# Patient Record
Sex: FEMALE
[De-identification: names, ages, dates, MRNs, and addresses within clinical notes are randomized per-mention and may not be internally consistent; named-entity substitution may affect disease eponyms.]

---

## 2018-06-04 ENCOUNTER — HOSPITAL ENCOUNTER (OUTPATIENT)
Dept: HOSPITAL 92 - ERS | Age: 58
Discharge: HOME | End: 2018-06-04
Attending: UROLOGY
Payer: SELF-PAY

## 2018-06-04 DIAGNOSIS — N13.2: Primary | ICD-10-CM

## 2018-06-04 DIAGNOSIS — S39.012A: ICD-10-CM

## 2018-06-04 DIAGNOSIS — E78.5: ICD-10-CM

## 2018-06-04 DIAGNOSIS — G43.909: ICD-10-CM

## 2018-06-04 LAB
ALBUMIN SERPL BCG-MCNC: 4.3 G/DL (ref 3.5–5)
ALP SERPL-CCNC: 59 U/L (ref 40–150)
ALT SERPL W P-5'-P-CCNC: 22 U/L (ref 8–55)
ANION GAP SERPL CALC-SCNC: 13 MMOL/L (ref 10–20)
APTT PPP: 31.7 SEC (ref 22.9–36.1)
AST SERPL-CCNC: 15 U/L (ref 5–34)
BASOPHILS # BLD AUTO: 0 THOU/UL (ref 0–0.2)
BASOPHILS NFR BLD AUTO: 0.2 % (ref 0–1)
BILIRUB SERPL-MCNC: 0.3 MG/DL (ref 0.2–1.2)
BUN SERPL-MCNC: 14 MG/DL (ref 9.8–20.1)
CALCIUM SERPL-MCNC: 9.7 MG/DL (ref 7.8–10.44)
CHLORIDE SERPL-SCNC: 105 MMOL/L (ref 98–107)
CO2 SERPL-SCNC: 24 MMOL/L (ref 22–29)
CREAT CL PREDICTED SERPL C-G-VRATE: 0 ML/MIN (ref 70–130)
EOSINOPHIL # BLD AUTO: 0.1 THOU/UL (ref 0–0.7)
EOSINOPHIL NFR BLD AUTO: 0.9 % (ref 0–10)
GLOBULIN SER CALC-MCNC: 3.5 G/DL (ref 2.4–3.5)
GLUCOSE SERPL-MCNC: 121 MG/DL (ref 70–105)
HGB BLD-MCNC: 14.1 G/DL (ref 12–16)
INR PPP: 0.9
LYMPHOCYTES # BLD: 1.1 THOU/UL (ref 1.2–3.4)
LYMPHOCYTES NFR BLD AUTO: 14.1 % (ref 21–51)
MCH RBC QN AUTO: 31.7 PG (ref 27–31)
MCV RBC AUTO: 92 FL (ref 81–99)
MONOCYTES # BLD AUTO: 0.1 THOU/UL (ref 0.11–0.59)
MONOCYTES NFR BLD AUTO: 1.6 % (ref 0–10)
NEUTROPHILS # BLD AUTO: 6.2 THOU/UL (ref 1.4–6.5)
NEUTROPHILS NFR BLD AUTO: 83.2 % (ref 42–75)
PLATELET # BLD AUTO: 208 THOU/UL (ref 130–400)
POTASSIUM SERPL-SCNC: 4.2 MMOL/L (ref 3.5–5.1)
PROTHROMBIN TIME: 12.5 SEC (ref 12–14.7)
RBC # BLD AUTO: 4.45 MILL/UL (ref 4.2–5.4)
SODIUM SERPL-SCNC: 138 MMOL/L (ref 136–145)
WBC # BLD AUTO: 7.5 THOU/UL (ref 4.8–10.8)

## 2018-06-04 PROCEDURE — 88300 SURGICAL PATH GROSS: CPT

## 2018-06-04 PROCEDURE — 82365 CALCULUS SPECTROSCOPY: CPT

## 2018-06-04 PROCEDURE — 74176 CT ABD & PELVIS W/O CONTRAST: CPT

## 2018-06-04 PROCEDURE — 96361 HYDRATE IV INFUSION ADD-ON: CPT

## 2018-06-04 PROCEDURE — C1769 GUIDE WIRE: HCPCS

## 2018-06-04 PROCEDURE — 96374 THER/PROPH/DIAG INJ IV PUSH: CPT

## 2018-06-04 PROCEDURE — 85025 COMPLETE CBC W/AUTO DIFF WBC: CPT

## 2018-06-04 PROCEDURE — 85610 PROTHROMBIN TIME: CPT

## 2018-06-04 PROCEDURE — 96372 THER/PROPH/DIAG INJ SC/IM: CPT

## 2018-06-04 PROCEDURE — 85730 THROMBOPLASTIN TIME PARTIAL: CPT

## 2018-06-04 PROCEDURE — 96360 HYDRATION IV INFUSION INIT: CPT

## 2018-06-04 PROCEDURE — 80053 COMPREHEN METABOLIC PANEL: CPT

## 2018-06-04 NOTE — CT
CT OF THE ABDOMEN AND PELVIS:

 

Date:  06/04/18 

 

COMPARISON:  

None. 

 

HISTORY:  

Left paraspinal tenderness/pain radiating into the hip and lower extremity. 

 

TECHNIQUE:  

Serial axial CT imaging at 5 mm intervals from lung bases through pubic symphysis without contrast. C
oronal reformatted imaging obtained. 

 

FINDINGS:

The lack of contrast limits assessment of the viscera, bowel, vascular structures, and for lymphadeno
gabriela. 

 

The imaged lung bases appear unremarkable. No free intraperitoneal air noted. 

 

The liver, gallbladder, spleen, pancreas, and adrenal glands demonstrate a normal noncontrast enhance
d appearance. 

 

There is no evidence for obstructive uropathy or nephrolithiasis on the right. 

 

There is mild left-sided nephromegaly. There is hydronephrosis and hydroureter on the left. The left 
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 There
 is a stone within the distal left ureter at the left ureterovesical junction causing obstruction, be
st seen on axial image 79 and coronal image 96. This distal obstructing stone within the left ureter 
measures approximately 3.0 mm. 

 

There is diverticulosis of the sigmoid colon with no evidence for diverticulitis. There is a fat-cont
aining umbilical hernia present. There is no evidence for bowel obstruction. 

 

The osseous structures demonstrate no acute findings. 

 

IMPRESSION: 

Obstructive uropathy on the left secondary to a 3.0 mm distal left ureteral obstructing stone near th
e level of the ureterovesical junction. 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
ed as described above. 

 

 

POS: [facility]

## 2018-06-05 NOTE — OP
DATE OF PROCEDURE:  06/04/2018

 

PREOPERATIVE DIAGNOSIS:  Left ureteral stone.

 

POSTOPERATIVE DIAGNOSIS:  Left ureteral stone.

 

PROCEDURE:  Cystoscopy, left ureteroscopy, stone extraction.

 

SURGEON:  Mathieu Castellano M.D.

 

ANESTHESIA:  General.

 

INDICATIONS:  Ms. Herring is a 58-year-old female who presented with acute onset of left-sided flank 
pain.  Her pain cannot be well controlled in the emergency room.  CT scan demonstrated distal left ur
eteral stone.  She opted to proceed with surgical intervention.

 

DETAILS OF PROCEDURE:  The patient was given general anesthesia and IV antibiotics.  She is sterilely
 prepped and draped in lithotomy position.  The cystoscope was passed into the bladder.  The bladder 
was examined in its entirety.  There were no mucosal lesions, no stones were seen.  Rigid ureteroscop
y was then performed over a guidewire into the left ureter.  The ureteroscope was passed up to the mi
d ureter where she was noted to have bifurcation of the ureter.  She has a duplicated system with the
 upper pole and lower pole moiety joining at approximately the pelvis.  The ureteral stone was then v
isualized and grasping forceps were utilized to grasp the stone and remove it.  A stent was not place
d.  Retrograde pyelogram was performed.  At the termination of procedure, there are no residual stone
s.  The patient tolerated the procedure well.  She was transported from the operating room to recover
y room in stable condition.

 

COMPLICATIONS:  None.

 

ESTIMATED BLOOD LOSS:  Minimal.

## 2018-06-06 ENCOUNTER — HOSPITAL ENCOUNTER (EMERGENCY)
Dept: HOSPITAL 92 - ERS | Age: 58
Discharge: HOME | End: 2018-06-06
Payer: SELF-PAY

## 2018-06-06 DIAGNOSIS — E78.5: ICD-10-CM

## 2018-06-06 DIAGNOSIS — N13.2: Primary | ICD-10-CM

## 2018-06-06 LAB
ALBUMIN SERPL BCG-MCNC: 3.8 G/DL (ref 3.5–5)
ALP SERPL-CCNC: 55 U/L (ref 40–150)
ALT SERPL W P-5'-P-CCNC: 15 U/L (ref 8–55)
ANION GAP SERPL CALC-SCNC: 11 MMOL/L (ref 10–20)
AST SERPL-CCNC: 11 U/L (ref 5–34)
BASOPHILS # BLD AUTO: 0.1 THOU/UL (ref 0–0.2)
BASOPHILS NFR BLD AUTO: 0.9 % (ref 0–1)
BILIRUB SERPL-MCNC: 0.6 MG/DL (ref 0.2–1.2)
BUN SERPL-MCNC: 22 MG/DL (ref 9.8–20.1)
CALCIUM SERPL-MCNC: 8.8 MG/DL (ref 7.8–10.44)
CHLORIDE SERPL-SCNC: 107 MMOL/L (ref 98–107)
CO2 SERPL-SCNC: 24 MMOL/L (ref 22–29)
CREAT CL PREDICTED SERPL C-G-VRATE: 0 ML/MIN (ref 70–130)
CRYSTAL-AUWI FLAG: 0.2 (ref 0–15)
EOSINOPHIL # BLD AUTO: 0 THOU/UL (ref 0–0.7)
EOSINOPHIL NFR BLD AUTO: 0.2 % (ref 0–10)
GLOBULIN SER CALC-MCNC: 3.1 G/DL (ref 2.4–3.5)
GLUCOSE SERPL-MCNC: 118 MG/DL (ref 70–105)
HEV IGM SER QL: 0.5 (ref 0–7.99)
HGB BLD-MCNC: 12.7 G/DL (ref 12–16)
HYALINE CASTS #/AREA URNS LPF: (no result) LPF
LYMPHOCYTES # BLD: 1.1 THOU/UL (ref 1.2–3.4)
LYMPHOCYTES NFR BLD AUTO: 12.3 % (ref 21–51)
MCH RBC QN AUTO: 31.9 PG (ref 27–31)
MCV RBC AUTO: 93.3 FL (ref 81–99)
MONOCYTES # BLD AUTO: 0.6 THOU/UL (ref 0.11–0.59)
MONOCYTES NFR BLD AUTO: 6.1 % (ref 0–10)
NEUTROPHILS # BLD AUTO: 7.4 THOU/UL (ref 1.4–6.5)
NEUTROPHILS NFR BLD AUTO: 80.5 % (ref 42–75)
PATHC CAST-AUWI FLAG: 0 (ref 0–2.49)
PLATELET # BLD AUTO: 173 THOU/UL (ref 130–400)
POTASSIUM SERPL-SCNC: 3.8 MMOL/L (ref 3.5–5.1)
RBC # BLD AUTO: 3.98 MILL/UL (ref 4.2–5.4)
RBC UR QL AUTO: (no result) HPF (ref 0–3)
SODIUM SERPL-SCNC: 138 MMOL/L (ref 136–145)
SP GR UR STRIP: 1.01 (ref 1–1.04)
SPERM-AUWI FLAG: 0 (ref 0–9.9)
WBC # BLD AUTO: 9.2 THOU/UL (ref 4.8–10.8)
WBC UR QL AUTO: (no result) HPF (ref 0–3)
YEAST-AUWI FLAG: 0 (ref 0–25)

## 2018-06-06 PROCEDURE — 85025 COMPLETE CBC W/AUTO DIFF WBC: CPT

## 2018-06-06 PROCEDURE — 81003 URINALYSIS AUTO W/O SCOPE: CPT

## 2018-06-06 PROCEDURE — 87086 URINE CULTURE/COLONY COUNT: CPT

## 2018-06-06 PROCEDURE — 96361 HYDRATE IV INFUSION ADD-ON: CPT

## 2018-06-06 PROCEDURE — 96374 THER/PROPH/DIAG INJ IV PUSH: CPT

## 2018-06-06 PROCEDURE — 80053 COMPREHEN METABOLIC PANEL: CPT

## 2018-06-06 PROCEDURE — 81015 MICROSCOPIC EXAM OF URINE: CPT

## 2018-06-06 PROCEDURE — 83690 ASSAY OF LIPASE: CPT

## 2018-06-06 PROCEDURE — 74176 CT ABD & PELVIS W/O CONTRAST: CPT

## 2018-06-06 NOTE — CT
CT ABDOMEN AND PELVIS PERFOREMD WITHOUT CONTRAST ENHANCEMENT:

 

HISTORY:

The patient reports left flank pain and reportedly had flank pain surgery on 06/04/2018.  Reports the
 pain is similar to what she had been previously experiencing.

 

COMPARISON:

CT study from 06/04/2018.

 

FINDINGS:

The lung bases show subsegmental atelectasis in the right lung base.

 

The liver, spleen, pancreas, and gallbladder regions appear unremarkable, given the limitations of a 
noncontrast study.

 

The right and left adrenal glands are normal in appearance.  The right kidney is normal in appearance
.  There is marked left-sided hydronephrosis and hydroureter present.  There is actually a partially 
duplicated left collecting system with ureters connecting at what I believe to be the level of the pe
lvic brim.  I cannot definitely see two ureters beyond this level.  The tiny, 2 to 3 mm calculus that
 was noted on the previous examination is no longer seen.  There are some tiny calcifications on the 
left side of the pelvis, which appear to represent phleboliths.  There is some trace free fluid seen.
  The appendix region is unremarkable.  Minimal sigmoid diverticulosis is noted.  A fat-containing pa
raumbilical hernia is seen.

 

IMPRESSION:

1.  Marked hydronephrosis and hydroureter of a duplicated left collecting system.  The distal left ur
eteral calculus seen on the previous examination is no longer definitely visualized, but the hydronep
hrosis and hydroureter persist.  There is perinephric and periureteral fat stranding seen.

2.  Sigmoid diverticulosis.

3.  Fat-containing paraumbilical hernia.

 

POS: [facility]

## 2018-06-08 ENCOUNTER — HOSPITAL ENCOUNTER (EMERGENCY)
Dept: HOSPITAL 92 - ERS | Age: 58
Discharge: HOME | End: 2018-06-08
Payer: SELF-PAY

## 2018-06-08 DIAGNOSIS — E78.5: ICD-10-CM

## 2018-06-08 DIAGNOSIS — R10.32: Primary | ICD-10-CM

## 2018-06-08 DIAGNOSIS — G43.909: ICD-10-CM

## 2018-06-08 DIAGNOSIS — M54.9: ICD-10-CM

## 2018-06-08 LAB
ALBUMIN SERPL BCG-MCNC: 4.1 G/DL (ref 3.5–5)
ALP SERPL-CCNC: 58 U/L (ref 40–150)
ALT SERPL W P-5'-P-CCNC: 25 U/L (ref 8–55)
ANION GAP SERPL CALC-SCNC: 15 MMOL/L (ref 10–20)
AST SERPL-CCNC: 16 U/L (ref 5–34)
BACTERIA UR QL AUTO: (no result) HPF
BASOPHILS # BLD AUTO: 0 THOU/UL (ref 0–0.2)
BASOPHILS NFR BLD AUTO: 0.3 % (ref 0–1)
BILIRUB SERPL-MCNC: 0.7 MG/DL (ref 0.2–1.2)
BUN SERPL-MCNC: 16 MG/DL (ref 9.8–20.1)
CALCIUM SERPL-MCNC: 9.3 MG/DL (ref 7.8–10.44)
CHLORIDE SERPL-SCNC: 106 MMOL/L (ref 98–107)
CO2 SERPL-SCNC: 21 MMOL/L (ref 22–29)
CREAT CL PREDICTED SERPL C-G-VRATE: 0 ML/MIN (ref 70–130)
CRYSTAL-AUWI FLAG: 0.5 (ref 0–15)
EOSINOPHIL # BLD AUTO: 0.2 THOU/UL (ref 0–0.7)
EOSINOPHIL NFR BLD AUTO: 1.8 % (ref 0–10)
GLOBULIN SER CALC-MCNC: 3.5 G/DL (ref 2.4–3.5)
GLUCOSE SERPL-MCNC: 139 MG/DL (ref 70–105)
HEV IGM SER QL: 5.3 (ref 0–7.99)
HGB BLD-MCNC: 12.9 G/DL (ref 12–16)
HYALINE CASTS #/AREA URNS LPF: (no result) LPF
LYMPHOCYTES # BLD: 1 THOU/UL (ref 1.2–3.4)
LYMPHOCYTES NFR BLD AUTO: 11.2 % (ref 21–51)
MCH RBC QN AUTO: 31.8 PG (ref 27–31)
MCV RBC AUTO: 93.2 FL (ref 81–99)
MONOCYTES # BLD AUTO: 0.4 THOU/UL (ref 0.11–0.59)
MONOCYTES NFR BLD AUTO: 4 % (ref 0–10)
NEUTROPHILS # BLD AUTO: 7.5 THOU/UL (ref 1.4–6.5)
NEUTROPHILS NFR BLD AUTO: 82.7 % (ref 42–75)
PATHC CAST-AUWI FLAG: 0 (ref 0–2.49)
PLATELET # BLD AUTO: 187 THOU/UL (ref 130–400)
POTASSIUM SERPL-SCNC: 3.8 MMOL/L (ref 3.5–5.1)
PREGS CONTROL BACKGROUND?: (no result)
PREGS CONTROL BAR APPEAR?: YES
RBC # BLD AUTO: 4.07 MILL/UL (ref 4.2–5.4)
SODIUM SERPL-SCNC: 138 MMOL/L (ref 136–145)
SP GR UR STRIP: 1.02 (ref 1–1.04)
SPERM-AUWI FLAG: 0 (ref 0–9.9)
WBC # BLD AUTO: 9.1 THOU/UL (ref 4.8–10.8)
WBC UR QL AUTO: (no result) HPF (ref 0–3)
YEAST-AUWI FLAG: 0 (ref 0–25)

## 2018-06-08 PROCEDURE — 81003 URINALYSIS AUTO W/O SCOPE: CPT

## 2018-06-08 PROCEDURE — 96365 THER/PROPH/DIAG IV INF INIT: CPT

## 2018-06-08 PROCEDURE — 96375 TX/PRO/DX INJ NEW DRUG ADDON: CPT

## 2018-06-08 PROCEDURE — 87086 URINE CULTURE/COLONY COUNT: CPT

## 2018-06-08 PROCEDURE — 85025 COMPLETE CBC W/AUTO DIFF WBC: CPT

## 2018-06-08 PROCEDURE — 84703 CHORIONIC GONADOTROPIN ASSAY: CPT

## 2018-06-08 PROCEDURE — 80053 COMPREHEN METABOLIC PANEL: CPT

## 2018-06-08 PROCEDURE — 81015 MICROSCOPIC EXAM OF URINE: CPT

## 2019-04-27 ENCOUNTER — HOSPITAL ENCOUNTER (EMERGENCY)
Dept: HOSPITAL 92 - ERS | Age: 59
Discharge: HOME | End: 2019-04-27
Payer: SELF-PAY

## 2019-04-27 DIAGNOSIS — S70.01XA: ICD-10-CM

## 2019-04-27 DIAGNOSIS — E78.5: ICD-10-CM

## 2019-04-27 DIAGNOSIS — G43.909: ICD-10-CM
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
## 2019-04-27 DIAGNOSIS — S13.9XXA: Primary | ICD-10-CM

## 2019-04-27 DIAGNOSIS — M54.5: ICD-10-CM

## 2019-04-27 LAB
ALBUMIN SERPL BCG-MCNC: 4.3 G/DL (ref 3.5–5)
ALP SERPL-CCNC: 51 U/L (ref 40–150)
ALT SERPL W P-5'-P-CCNC: 18 U/L (ref 8–55)
ANION GAP SERPL CALC-SCNC: 14 MMOL/L (ref 10–20)
AST SERPL-CCNC: 16 U/L (ref 5–34)
BILIRUB SERPL-MCNC: 0.4 MG/DL (ref 0.2–1.2)
BUN SERPL-MCNC: 12 MG/DL (ref 9.8–20.1)
CALCIUM SERPL-MCNC: 9.6 MG/DL (ref 7.8–10.44)
CHLORIDE SERPL-SCNC: 108 MMOL/L (ref 98–107)
CO2 SERPL-SCNC: 23 MMOL/L (ref 22–29)
CREAT CL PREDICTED SERPL C-G-VRATE: 0 ML/MIN (ref 70–130)
GLOBULIN SER CALC-MCNC: 3 G/DL (ref 2.4–3.5)
GLUCOSE SERPL-MCNC: 98 MG/DL (ref 70–105)
HGB BLD-MCNC: 14.5 G/DL (ref 12–16)
MCH RBC QN AUTO: 31.2 PG (ref 27–31)
MCV RBC AUTO: 91.4 FL (ref 78–98)
MDIFF COMPLETE?: YES
PLATELET # BLD AUTO: 132 THOU/UL (ref 130–400)
POTASSIUM SERPL-SCNC: 4 MMOL/L (ref 3.5–5.1)
RBC # BLD AUTO: 4.65 MILL/UL (ref 4.2–5.4)
SODIUM SERPL-SCNC: 141 MMOL/L (ref 136–145)
WBC # BLD AUTO: 5.9 THOU/UL (ref 4.8–10.8)

## 2019-04-27 PROCEDURE — 85025 COMPLETE CBC W/AUTO DIFF WBC: CPT

## 2019-04-27 PROCEDURE — 96374 THER/PROPH/DIAG INJ IV PUSH: CPT

## 2019-04-27 PROCEDURE — 70450 CT HEAD/BRAIN W/O DYE: CPT

## 2019-04-27 PROCEDURE — 93005 ELECTROCARDIOGRAM TRACING: CPT

## 2019-04-27 PROCEDURE — 86850 RBC ANTIBODY SCREEN: CPT

## 2019-04-27 PROCEDURE — 71045 X-RAY EXAM CHEST 1 VIEW: CPT

## 2019-04-27 PROCEDURE — 96375 TX/PRO/DX INJ NEW DRUG ADDON: CPT

## 2019-04-27 PROCEDURE — 72125 CT NECK SPINE W/O DYE: CPT

## 2019-04-27 PROCEDURE — 72170 X-RAY EXAM OF PELVIS: CPT

## 2019-04-27 PROCEDURE — 72100 X-RAY EXAM L-S SPINE 2/3 VWS: CPT

## 2019-04-27 PROCEDURE — 86900 BLOOD TYPING SEROLOGIC ABO: CPT

## 2019-04-27 PROCEDURE — 80053 COMPREHEN METABOLIC PANEL: CPT

## 2019-04-27 PROCEDURE — 86901 BLOOD TYPING SEROLOGIC RH(D): CPT
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
